# Patient Record
Sex: MALE | Race: WHITE | URBAN - METROPOLITAN AREA
[De-identification: names, ages, dates, MRNs, and addresses within clinical notes are randomized per-mention and may not be internally consistent; named-entity substitution may affect disease eponyms.]

---

## 2019-04-22 NOTE — PROGRESS NOTES
3 [facility]   Hereditary Cancer Risk Assessment      Name: Juarez Ambrose   YOB: 1980    Date of Consultation: 4/24/19     Mr. Barrientos was seen at the [facility] for genetic counseling on 4/24/19. Mr. Konstantin Negrete was referred by Dr. Juan Darby due to his family history of cancer due to a BRCA2 gene mutation. Mr. Sonny Neville grandmother was also present at the appointment. PERSONAL AND FAMILY HISTORY   Mr. Konstantin Negrete is a 41 y.o.  male with no personal history of cancer. He has no major cancer risk factors. His family history is significant for his sister with breast cancer diagnosed at age 36 and his father with marcos 9 prostate cancer diagnosed at age 72. Both relatives recently underwent genetic testing and were found to carry a pathogenic BRCA2 gene mutation. His paternal grandfather also had prostate cancer in his 76s and his great grandmother had pancreatic cancer in her [de-identified]. There are no significant cancers in Mr. Sonny Neville maternal family. Mr. Konstantin Negrete reports  ancestry and there is no known Ashkenazi Sikhism heritage. RISK ASSESSMENT   We discussed that approximately 5-10% of cancers are due to a hereditary gene mutation which causes an increased risk for certain cancers. As previously mentioned, Mr. Sonny Neville father and sister both carry a pathogenic BRCA2 gene mutation. The specific mutation is called p. *. First degree relatives (parents, siblings, and children) of an individual with a BRCA2 mutation have a 1 in 2 (50%) chance of being a carrier for the same genetic mutation. Thus, Mr. Konstantin Negrete has a 50% chance to be a carrier for the mutation. In summary, Mr. Konstantin Negrete meets the [address] [place] [address] (NCCN) guidelines for genetic testing to clarify whether he carries the familial BRCA2 mutation.      Since there are no other cancers in Mr. Sonny Neville maternal family, there is a low likelihood that Mr. Justin Hubbard would carry a hereditary mutation in another cancer gene. Thus, genetic testing is recommended for the familial BRCA2 mutation only, at this time. DISCUSSION  We discussed that the BRCA2 gene is associated with an increased lifetime risk for female breast cancer and ovarian cancer, primarily. However, there is also an increased risk for male breast cancer and prostate cancer. We discussed the risks, benefits, and limitations of genetic testing. Possible test results were discussed as well as potential screening and prevention strategies. Specifically, we discussed increased breast cancer surveillance and prostate cancer surveillance. Lastly, we discussed that the results of Mr. Arden Solomon genetic testing may be beneficial in defining his risk for cancer as well as for his family members. SUMMARY & PLAN  1) Mr. Justin Hubbard meets the NCCN criteria for genetic testing and he elected to proceed with genetic testing for the familial BRCA2 gene mutation. 2) Informed consent was obtained and a blood sample was sent to [facility]. We will call Mr. Justin Hubbard with results as soon as they are available. A follow up appointment may be recommended. A summary letter with results and final medical management recommendations will be sent once available. A total of 60 minutes were spent face to face with Mr. Justin Hubbard and 50% of the time was spent educating and counseling. The [address] [place] [address] National Program would be glad to offer our assistance should you have any questions or concerns about this information. Please feel free to contact us at [phone]. Francesca Russell.  Jyothi Molina MS, [facility]   Licensed Genetic Counselor         CC:   Farzana Anderson MD

## 2019-04-25 ENCOUNTER — INITIAL CONSULT (OUTPATIENT)
Dept: ONCOLOGY | Age: 39
End: 2019-04-25
Payer: COMMERCIAL

## 2019-04-25 DIAGNOSIS — Z80.42 FAMILY HISTORY OF PROSTATE CANCER: ICD-10-CM

## 2019-04-25 DIAGNOSIS — Z84.81 FAMILY HISTORY OF BRCA2 GENE POSITIVE: ICD-10-CM

## 2019-04-25 DIAGNOSIS — Z80.3 FAMILY HISTORY OF BREAST CANCER: Primary | ICD-10-CM

## 2019-04-25 PROCEDURE — 96040 PR GENETIC COUNSELING, EACH 30 MIN: CPT | Performed by: GENETIC COUNSELOR, MS

## 2019-05-13 ENCOUNTER — TELEPHONE (OUTPATIENT)
Dept: ONCOLOGY | Age: 39
End: 2019-05-13

## 2019-05-13 NOTE — TELEPHONE ENCOUNTER
3 [facility]   Hereditary Cancer Risk Assessment     Name: Daniel Esquivel  YOB: 1980  Date of Results Disclosure: 5/9/19    HISTORY   Mr. Melecio Canseco was seen for genetic counseling on 4/25/19 at the request of Dr. Denisha Rodríguez due to his family history of cancer and a known BRCA2 gene mutation. At that time, Mr. Melecio Canesco chose to pursue genetic testing for the familial mutation. These results were discussed with Mr. Melecio Canseco on 5/9/19 via telephone. A summary of Mr. Ashia Monroy results and recommendations are below. RESULTS  [facility]  SPECIFIC SITE ANALYSIS  BRCA2 p. *: NEGATIVE - PATHOGENIC MUTATION NOT DETECTED   Please refer to genetic test report for technical details. We discussed that Mr. Ashia Monroy test result confirms that he does not carry the pathogenic BRCA2 mutation that has previously been identified in the family. Mr. Ashia Monroy lifetime risk for cancer is expected to be equal to the general population risk. Therefore, Mr. Melecio Canseco should continue general population cancer screening guidelines as directed by his physicians. RECOMMENDATIONS FOR FAMILY MEMBERS   1) Genetic testing is not recommended for Mr. Ashia Monroy children based on his negative test results. However, this recommendation does not take into consideration any family history of cancer in their maternal family. 2) We encourage Mr. Ashia Monroy remaining relatives to consider genetic counseling and testing for the familial BRCA2 gene mutation. Relatives may contact the [address] [place], [place] [address] at [phone] or locate a genetic counselor at [email or web address].     3) We encourage Mr. Ashia Monroy relatives to discuss their family history of cancer with their physicians to determine the most appropriate cancer screening recommendations.      SUMMARY & PLAN   1) Mr. Ashia Monroy genetic test results confirm that he does not carry the familial